# Patient Record
Sex: FEMALE | Race: WHITE | NOT HISPANIC OR LATINO | URBAN - METROPOLITAN AREA
[De-identification: names, ages, dates, MRNs, and addresses within clinical notes are randomized per-mention and may not be internally consistent; named-entity substitution may affect disease eponyms.]

---

## 2023-10-19 ENCOUNTER — APPOINTMENT (RX ONLY)
Dept: URBAN - METROPOLITAN AREA CLINIC 359 | Facility: CLINIC | Age: 33
Setting detail: DERMATOLOGY
End: 2023-10-19

## 2023-10-19 DIAGNOSIS — L65.0 TELOGEN EFFLUVIUM: ICD-10-CM

## 2023-10-19 PROCEDURE — ? COUNSELING

## 2023-10-19 PROCEDURE — ? TREATMENT REGIMEN

## 2023-10-19 PROCEDURE — 99203 OFFICE O/P NEW LOW 30 MIN: CPT

## 2023-10-19 PROCEDURE — ? ORDER TESTS

## 2023-10-19 PROCEDURE — ? FULL BODY SKIN EXAM - DECLINED

## 2023-10-19 ASSESSMENT — LOCATION DETAILED DESCRIPTION DERM: LOCATION DETAILED: MEDIAL FRONTAL SCALP

## 2023-10-19 ASSESSMENT — LOCATION ZONE DERM: LOCATION ZONE: SCALP

## 2023-10-19 ASSESSMENT — LOCATION SIMPLE DESCRIPTION DERM: LOCATION SIMPLE: FRONTAL SCALP

## 2023-10-19 NOTE — HPI: HAIR LOSS
Previous Labs: Yes
How Did The Hair Loss Occur?: gradual in onset
How Severe Is Your Hair Loss?: moderate
When Were The Labs Drawn? (Drawn...): 2-3 weeks ago
Lab Details: [facility]
What Hair Products Do You Use?: Clarifying shampoo once weekly

## 2023-10-19 NOTE — PROCEDURE: ORDER TESTS
Bill For Surgical Tray: no
Expected Date Of Service: 10/19/2023
Performing Laboratory: 0
Billing Type: Third-Party Bill